# Patient Record
Sex: FEMALE | Race: WHITE
[De-identification: names, ages, dates, MRNs, and addresses within clinical notes are randomized per-mention and may not be internally consistent; named-entity substitution may affect disease eponyms.]

---

## 2019-09-04 ENCOUNTER — HOSPITAL ENCOUNTER (OUTPATIENT)
Dept: HOSPITAL 88 - OR | Age: 54
Discharge: HOME | End: 2019-09-04
Attending: SPECIALIST
Payer: COMMERCIAL

## 2019-09-04 VITALS — SYSTOLIC BLOOD PRESSURE: 114 MMHG | DIASTOLIC BLOOD PRESSURE: 66 MMHG

## 2019-09-04 DIAGNOSIS — G47.33: ICD-10-CM

## 2019-09-04 DIAGNOSIS — M10.9: ICD-10-CM

## 2019-09-04 DIAGNOSIS — Z01.812: ICD-10-CM
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
## 2019-09-04 DIAGNOSIS — R03.0: ICD-10-CM

## 2019-09-04 DIAGNOSIS — M22.42: ICD-10-CM

## 2019-09-04 DIAGNOSIS — F32.9: ICD-10-CM

## 2019-09-04 DIAGNOSIS — F17.210: ICD-10-CM

## 2019-09-04 DIAGNOSIS — M17.12: ICD-10-CM

## 2019-09-04 DIAGNOSIS — Z88.6: ICD-10-CM

## 2019-09-04 DIAGNOSIS — S83.222A: Primary | ICD-10-CM

## 2019-09-04 PROCEDURE — 29881 ARTHRS KNE SRG MNISECTMY M/L: CPT

## 2019-09-04 PROCEDURE — 93005 ELECTROCARDIOGRAM TRACING: CPT

## 2019-09-04 NOTE — XMS REPORT
Clinical Summary

                             Created on: 2019



Homa Perez

External Reference #: [id number]

: 1965

Sex: Female



Demographics







                          Address                   [address]

[place]  [address]

 

                          Home Phone                [phone]

 

                          Preferred Language        English

 

                          Marital Status            

 

                          Sabianism Affiliation     [id number]

 

                          Race                      White

 

                          Ethnic Group              Non-





Author







                          Author                    [facility]

 

                          Organization              [facility]

 

                          Address                   Unknown

 

                          Phone                     Unavailable







Support







                Name            Relationship    Address         Phone

 

                Yousif Perez    [employer]            Unknown         [phone]







Care Team Providers







                    Care Team Member Name    Role                Phone

 

                    Michael Napoles MD    PCP                 [phone]







Allergies

Not on File



Medications

Not on file



Active Problems





Not on file



Social History







                                        Date



                 Tobacco Use     Types           Packs/Day       Years Used 

 

                                         



                                         Never Assessed    









 



                           Sex Assigned at Birth     Date Recorded

 

 



                                         Not on file 









                                        Industry



                           Job Start Date            Occupation 

 

                                        Not on file



                           Not on file               Not on file 









                                        Travel End



                           Travel History            Travel Start 

 





                                         No recent travel history available.







Last Filed Vital Signs

Not on file



Plan of Treatment







   



                 Health Maintenance     Due Date        Last Done       Comments

 

   



                           CERVICAL CANCER SCREENING     1986  

 

   



                     BREAST CANCER SCREENING     2015 

 

   



                           COLONOSCOPY SCREENING     2015  

 

   



                           SHINGLES VACCINES (#1)     2015  

 

   



                           INFLUENZA VACCINE         2019  







Results

Not on fileafter 2018



Advance Directives





For more information, please contact: [phone]









                          Patient Representative    Explanation



                           Type                      Date Recorded  

 

                                                     



                                         Advance Directives,   



                                         Living Will and   



                                         Medical Power of   



                                            

 

                                                     



                           Advance Directives,       2018 11:53 AM  



                                         Living Will and   



                                         Medical Power of

## 2019-09-04 NOTE — XMS REPORT
Patient Summary Document

                             Created on: 2019



KARI JIN

External Reference #: [id number]

: 1965

Sex: Female



Demographics







                          Address                   [address] [place] [address]  [address]

 

                          Home Phone                [phone]

 

                          Preferred Language        Unknown

 

                          Marital Status            Unknown

 

                          [employer] Affiliation     Unknown

 

                          Race                      Unknown

 

                                        Additional Race(s)  

 

                          Ethnic Group              Unknown





Author







                          Author                    [facility]

 

                          Address                   Unknown

 

                          Phone                     Unavailable







Care Team Providers







                    Care Team Member Name    Role                Phone

 

                    KIKO DUMONT    Unavailable         Unavailable







Problems

This patient has no known problems.



Allergies, Adverse Reactions, Alerts

This patient has no known allergies or adverse reactions.



Medications

This patient has no known medications.



Results







           Test Description    Test Time    Test Comments    Text Results    Atomic Results    Result

 Comments

 

                MRI KNEE LEFT WO                                        [place] [address]      Patient Name: KARI JIN   MR #: 
[id number]    : 1965 Age/Sex: 52/F  Acct #: [id number] Req #: [id number]
[id number]  Adm Physician:     Ordered by: KIKO DUMONT MD  Report #: [id number]
[id number]   Location: MRI  Room/Bed:     
__________________________________________________________________________
_________________________    Procedure: [id number] MRI/MRI KNEE LEFT WO  Exam 
Date: 10/17/17                            Exam Time: 1534       REPORT STATUS: 
Signed    Left knee MRI without contrast.      History: Knee pain. Medial 
meniscus tear. Twisting injury. Prior surgery.      Comparison: None.      
Technique: Multiplanar multi-sequence MRI of the knee without contrast.      
Findings:       Medial compartment: There is an oblique undersurface tear at the
posterior horn   of the medial meniscus best seen on series 2 image 27. A 
portion of this is   likely postsurgical. The medial compartmental articular 
cartilage surfaces are   thinned with regions of fraying and fissuring. There 
are small peripheral   marginal osteophytes. The medial collateral ligament 
complex is intact.      Lateral compartment: No meniscal tear or cartilage 
abnormality.  The LCL   complex is normal.      Intercondylar notch:  The ACL 
and PCL are intact.      Patellofemoral compartment: There is articular 
cartilage fraying and deep   fissuring in the patellofemoral compartment.      
Extensor mechanism:  The quadriceps and patellar tendons are normal.        
Other findings:  There is a joint effusion and synovitis.  There is no acute   
fracture, subluxation or avascular necrosis.      IMPRESSION:     Oblique 
undersurface tear at the posterior horn of the medial meniscus. A   portion of 
this is likely postsurgical. There is mild degenerative arthrosis in   the 
medial compartment of the knee.      Articular cartilage fraying and deep 
fissuring in the patellofemoral   compartment.      Signed by: Dr. Renzo Jack M.D. on 10/17/2017 4:06 PM        Dictated By: RENZO JACK MD, MD  
Electronically Signed By: RENZO JACK MD, MD on 10/17/17 1606  Transcribed By: 
FRANCISCO J on 10/17/17 1606       COPY TO:   KIKO DUMONT MD

## 2019-09-05 NOTE — OPERATIVE REPORT
DATE OF PROCEDURE:  09/04/2019

 

SURGEON:  Pedrito Plata MD

 

PREOPERATIVE DIAGNOSES:  Left knee medial meniscus tear, left knee degenerative joint

disease of the knee. 

 

POSTOPERATIVE DIAGNOSES:  Left knee medial meniscus tear, left knee degenerative joint

disease of the knee. 

 

OPERATIONS AND PROCEDURES PERFORMED:  The patient underwent a left knee examination

under anesthesia, left knee arthroscopy, left knee partial medial meniscectomy, left

knee chondroplasty of the patella, the trochlea, the medial femoral condyle, the medial

tibial plateau, and the lateral tibial plateau. 

 

ASSISTANT:  There was no assistant.

 

ANESTHESIA:  General endotracheal intubation anesthesia.

 

IV FLUIDS:  Per the anesthesia record.

 

COMPLICATIONS:  None.

 

BRIEF DESCRIPTION OF THE PATIENT'S OPERATIVE PROCEDURE:  Ms. Perez was taken to the

operating room, placed in a supine position on the operating table.  Following induction

of general anesthesia as well as endotracheal intubation, the patient's left lower

extremity was examined under anesthesia.  She was found to have a mild effusion within

the knee joint, but otherwise ligamentously stable knee.  The patient's lower extremity

was prepped and draped in standard surgical fashion.  A two-port technique was used to

provide this patient arthroscopic evaluation of the knee joint.  Examination of

suprapatellar pouch, medial and lateral gutters found no evidence of loose bodies.

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  The

scope was advanced to the medial compartment, examination of the medial compartment

demonstrated a small tear in the posterior horn of the medial meniscus.  There was also

significant chondromalacia of the medial femoral condyle and medial tibial plateau.  A

combination of biting forceps and a motorized shaver was used to resect the torn portion

of meniscus.  Chondroplasties of the medial femoral condyle and medial tibial plateau

performed at this time.  Scope was then advanced into the intercondylar notch and the

anterior cruciate ligament was identified and found to be intact.  The scope was then

advanced to the lateral compartment and there was chondromalacia of the lateral tibial

plateau.  A chondroplasty of this surface was performed.  The scope was then placed in

suprapatellar pouch and chondroplasties of the patella and trochlea were performed.  The

knee was then deflated with sterile normal saline.  Each of the portal sites were closed

using 4-0 nylon suture.  The portal sites as well as the knee itself were then injected

with 0.5% Marcaine with epinephrine.  Sterile 

dressings were applied and the patient was awakened and taken to the postanesthesia care

unit in stable condition. 

 

 

 

 

______________________________

MD ZENA Desai/RONEN

D:  09/05/2019 10:33:15

T:  09/05/2019 16:40:03

Job #:  [id number]

## 2021-10-22 ENCOUNTER — HOSPITAL ENCOUNTER (OUTPATIENT)
Dept: HOSPITAL 88 - OR | Age: 56
Discharge: HOME | End: 2021-10-22
Attending: INTERNAL MEDICINE
Payer: COMMERCIAL

## 2021-10-22 VITALS — DIASTOLIC BLOOD PRESSURE: 72 MMHG | SYSTOLIC BLOOD PRESSURE: 138 MMHG

## 2021-10-22 DIAGNOSIS — K62.89: ICD-10-CM

## 2021-10-22 DIAGNOSIS — F41.9: ICD-10-CM

## 2021-10-22 DIAGNOSIS — K31.89: ICD-10-CM

## 2021-10-22 DIAGNOSIS — K29.60: ICD-10-CM

## 2021-10-22 DIAGNOSIS — I10: ICD-10-CM

## 2021-10-22 DIAGNOSIS — Z72.0: ICD-10-CM

## 2021-10-22 DIAGNOSIS — K21.9: ICD-10-CM

## 2021-10-22 DIAGNOSIS — Z20.822: ICD-10-CM

## 2021-10-22 DIAGNOSIS — K31.7: ICD-10-CM

## 2021-10-22 DIAGNOSIS — G47.33: ICD-10-CM

## 2021-10-22 DIAGNOSIS — K22.70: Primary | ICD-10-CM

## 2021-10-22 DIAGNOSIS — Z01.812: ICD-10-CM

## 2021-10-22 DIAGNOSIS — K29.50: ICD-10-CM

## 2021-10-22 DIAGNOSIS — K58.0: ICD-10-CM

## 2021-10-22 DIAGNOSIS — E78.5: ICD-10-CM

## 2021-10-22 DIAGNOSIS — Z01.810: ICD-10-CM

## 2021-10-22 DIAGNOSIS — K20.90: ICD-10-CM

## 2021-10-22 DIAGNOSIS — D12.3: ICD-10-CM

## 2021-10-22 LAB
C DIFFICILE TOXIN A&B AMP PROB: NEGATIVE
LACTOFERRIN STL QL: NEGATIVE

## 2021-10-22 PROCEDURE — 83516 IMMUNOASSAY NONANTIBODY: CPT

## 2021-10-22 PROCEDURE — 86256 FLUORESCENT ANTIBODY TITER: CPT

## 2021-10-22 PROCEDURE — 83630 LACTOFERRIN FECAL (QUAL): CPT

## 2021-10-22 PROCEDURE — 87328 CRYPTOSPORIDIUM AG IA: CPT

## 2021-10-22 PROCEDURE — 83993 ASSAY FOR CALPROTECTIN FECAL: CPT

## 2021-10-22 PROCEDURE — 82784 ASSAY IGA/IGD/IGG/IGM EACH: CPT

## 2021-10-22 PROCEDURE — 87045 FECES CULTURE AEROBIC BACT: CPT

## 2021-10-22 PROCEDURE — 45380 COLONOSCOPY AND BIOPSY: CPT

## 2021-10-22 PROCEDURE — 45385 COLONOSCOPY W/LESION REMOVAL: CPT

## 2021-10-22 PROCEDURE — 87493 C DIFF AMPLIFIED PROBE: CPT

## 2021-10-22 PROCEDURE — 43239 EGD BIOPSY SINGLE/MULTIPLE: CPT

## 2021-10-22 PROCEDURE — 45378 DIAGNOSTIC COLONOSCOPY: CPT

## 2021-10-22 PROCEDURE — 87177 OVA AND PARASITES SMEARS: CPT

## 2021-10-22 PROCEDURE — 93005 ELECTROCARDIOGRAM TRACING: CPT
